# Patient Record
Sex: FEMALE | Race: WHITE
[De-identification: names, ages, dates, MRNs, and addresses within clinical notes are randomized per-mention and may not be internally consistent; named-entity substitution may affect disease eponyms.]

---

## 2019-08-08 ENCOUNTER — HOSPITAL ENCOUNTER (EMERGENCY)
Dept: HOSPITAL 76 - ED | Age: 39
Discharge: HOME | End: 2019-08-08
Payer: SELF-PAY

## 2019-08-08 VITALS — SYSTOLIC BLOOD PRESSURE: 123 MMHG | DIASTOLIC BLOOD PRESSURE: 59 MMHG

## 2019-08-08 DIAGNOSIS — M79.601: Primary | ICD-10-CM

## 2019-08-08 DIAGNOSIS — M25.511: ICD-10-CM

## 2019-08-08 PROCEDURE — 99283 EMERGENCY DEPT VISIT LOW MDM: CPT

## 2019-08-08 PROCEDURE — 99284 EMERGENCY DEPT VISIT MOD MDM: CPT

## 2019-08-08 PROCEDURE — 96372 THER/PROPH/DIAG INJ SC/IM: CPT

## 2019-08-08 NOTE — ED PHYSICIAN DOCUMENTATION
History of Present Illness





- Stated complaint


Stated Complaint: RT ARM PX





- Chief complaint


Chief Complaint: Ext Problem





- History obtained from


History obtained from: Patient





- Additonal information


Additional information: 





Patient is a 38-year-old female, right-handed, presenting with over 2 years of 

right arm discomfort particularly in the shoulder without particular inciting 

incident, trauma, fall.  Patient reports that she used to perform repetitive 

work with her right arm at a car wash and feels this may have instigated her 

discomfort.  Patient reports limited range of motion at shoulder and believe she

has decreased strength to the right hand, but no sensation changes.  Patient 

denies swelling, erythema, rash or other skin changes.  Patient states that she 

does not like to take medications and therefore has not tried over-the-counter 

medications for this discomfort.  Patient also does not feel physical therapy 

will help as she has tried some stretching at home.  No other improving or 

worsening factors noted.Patient denies pregnancy.





Review of Systems


Musculoskeletal: reports: Extremity pain


Neurologic: reports: Focal weakness.  denies: Numbness





PD PAST MEDICAL HISTORY





- Past Medical History


Cardiovascular: None


Respiratory: Asthma


GI: None


GYN: Ovarian cancer


: None


HEENT: None


Psych: None


Musculoskeletal: None


Derm: None





- Past Surgical History


Past Surgical History: Yes


/GYN: Tubal ligation





- Present Medications


Home Medications: 


                                Ambulatory Orders











 Medication  Instructions  Recorded  Confirmed


 


Aspirin/Acetaminophen/Caffeine PRN 05/13/14 05/13/14





[Excedrin Migraine Geltabs]   


 


HYDROcod/ACETAM 5/325 [Norco 5/325] 1 - 2 ea PO Q6H PRN #15 tablet 10/25/16 














- Allergies


Allergies/Adverse Reactions: 


                                    Allergies











Allergy/AdvReac Type Severity Reaction Status Date / Time


 


No Known Drug Allergies Allergy   Verified 10/21/13 15:15














- Social History


Does the pt smoke?: No


Smoking Status: Never smoker


Does the pt drink ETOH?: No


Does the pt have substance abuse?: No





- Immunizations


Immunizations are current?: Yes





PD ED PE NORMAL





- Vitals


Vital signs reviewed: Yes





- General


General: Alert and oriented X 3, No acute distress, Well developed/nourished





- HEENT


HEENT: Atraumatic, Moist mucous membranes





- Neck


Neck: Supple, no meningeal sign





- Cardiac


Cardiac: Strong equal pulses





- Respiratory


Respiratory: No respiratory distress





- Derm


Derm: Normal color, Warm and dry, No rash





- Extremities


Extremities: No deformity, No tenderness to palpate, No edema, Other (No 

palpable deformity or bony tenderness to right upper extremity.  No objective 

changes in strength, sensation to right upper extremity.  Mild reduction of 

abduction at right shoulder only.)





- Neuro


Neuro: Alert and oriented X 3, No motor deficit, No sensory deficit





- Psych


Psych: Normal mood, Normal affect





Results





- Vitals


Vitals: 


                               Vital Signs - 24 hr











  08/08/19





  11:04


 


Temperature 36.7 C


 


Heart Rate 95


 


Respiratory 16





Rate 


 


Blood Pressure 123/59 L


 


O2 Saturation 100








                                     Oxygen











O2 Source                      Room air

















PD MEDICAL DECISION MAKING





- ED course


Complexity details: considered differential, d/w patient


ED course: 





Patient presenting with several years of chronic right upper extremity pain 

possibly worsened by repetitive motions.  Patient declines physical therapy and 

over-the-counter medication therapies.  No new injury or trauma.  Do not have 

high suspicion for bony abnormalities including dislocation or fracture.  Do not

feel patient requires x-rays at this time.  Do not find evidence of gout, joint 

infection, other skin infection including cellulitis, lymphangitis, abscess.  Do

have concern for possible rotator cuff injury or biceps tendinitis.  However, do

not feel the patient is experiencing an emergent issue today and can otherwise 

be referred to a primary care physician and orthopedic surgery as an outpatient.

 Also discussed other supportive cares return precautions.  Offered Toradol, 

which was given in the ED.  Patient otherwise voices understanding and is 

comfortable with discharge plan.





Departure





- Departure


Disposition: 01 Home, Self Care


Clinical Impression: 


Pain of upper extremity


Qualifiers:


 Laterality: right Qualified Code(s): M79.601 - Pain in right arm





Condition: Good


Instructions:  ED Torn Rotator Cuff, ED Strain Muscle Ext


Follow-Up: 


your,doctor [Other] - Within 3 Days


Marlyn Garrett MD [Provider Admit Priv/Credential] - Within 3 Days


Comments: 


Recommend supportive cares and avoidance of repetitive motions.  May use sling 

as needed for comfort, but did not leave in constantly as this can cause a stiff

shoulder.  Please establish primary care follow-up for further evaluation and po

ssible referrals including physical therapy.  May also contact orthopedic 

surgery for further evaluation particularly for tendon or ligamentous injury.  

Return to ED sooner if experience worsening symptoms or have other concerns.

## 2019-08-14 ENCOUNTER — HOSPITAL ENCOUNTER (OUTPATIENT)
Dept: HOSPITAL 76 - EMS | Age: 39
Discharge: ANOTHER HOSPITAL | End: 2019-08-14
Attending: SURGERY
Payer: SELF-PAY

## 2019-08-14 DIAGNOSIS — R60.0: ICD-10-CM

## 2019-08-14 DIAGNOSIS — R09.89: Primary | ICD-10-CM
